# Patient Record
Sex: MALE | Race: WHITE | Employment: FULL TIME | URBAN - METROPOLITAN AREA
[De-identification: names, ages, dates, MRNs, and addresses within clinical notes are randomized per-mention and may not be internally consistent; named-entity substitution may affect disease eponyms.]

---

## 2017-03-09 PROBLEM — Z30.09 CONSULTATION FOR STERILIZATION: Status: ACTIVE | Noted: 2017-03-09

## 2017-07-15 ENCOUNTER — HOSPITAL ENCOUNTER (EMERGENCY)
Facility: HOSPITAL | Age: 47
Discharge: HOME | End: 2017-07-15
Attending: EMERGENCY MEDICINE
Payer: COMMERCIAL

## 2017-07-15 VITALS
BODY MASS INDEX: 26 KG/M2 | WEIGHT: 195 LBS | OXYGEN SATURATION: 95 % | RESPIRATION RATE: 16 BRPM | DIASTOLIC BLOOD PRESSURE: 71 MMHG | HEART RATE: 76 BPM | SYSTOLIC BLOOD PRESSURE: 126 MMHG | TEMPERATURE: 97 F

## 2017-07-15 DIAGNOSIS — T78.40XA ALLERGIC REACTION, INITIAL ENCOUNTER: Primary | ICD-10-CM

## 2017-07-15 PROCEDURE — 99285 EMERGENCY DEPT VISIT HI MDM: CPT

## 2017-07-15 PROCEDURE — 94664 DEMO&/EVAL PT USE INHALER: CPT

## 2017-07-15 PROCEDURE — 94640 AIRWAY INHALATION TREATMENT: CPT

## 2017-07-15 PROCEDURE — 96375 TX/PRO/DX INJ NEW DRUG ADDON: CPT

## 2017-07-15 PROCEDURE — 96372 THER/PROPH/DIAG INJ SC/IM: CPT

## 2017-07-15 PROCEDURE — S0028 INJECTION, FAMOTIDINE, 20 MG: HCPCS | Performed by: EMERGENCY MEDICINE

## 2017-07-15 PROCEDURE — 96374 THER/PROPH/DIAG INJ IV PUSH: CPT

## 2017-07-15 RX ORDER — EPINEPHRINE 0.3 MG/.3ML
0.3 INJECTION SUBCUTANEOUS
Qty: 1 EACH | Refills: 0 | Status: SHIPPED | OUTPATIENT
Start: 2017-07-15 | End: 2017-08-14

## 2017-07-15 RX ORDER — METHYLPREDNISOLONE 4 MG/1
TABLET ORAL
Qty: 1 PACKAGE | Refills: 0 | Status: SHIPPED | OUTPATIENT
Start: 2017-07-15 | End: 2017-07-20

## 2017-07-15 RX ORDER — DIPHENHYDRAMINE HYDROCHLORIDE 50 MG/ML
50 INJECTION INTRAMUSCULAR; INTRAVENOUS ONCE
Status: COMPLETED | OUTPATIENT
Start: 2017-07-15 | End: 2017-07-15

## 2017-07-15 RX ORDER — HYDROXYZINE HYDROCHLORIDE 25 MG/1
TABLET, FILM COATED ORAL
Qty: 20 TABLET | Refills: 0 | Status: SHIPPED | OUTPATIENT
Start: 2017-07-15 | End: 2017-08-14

## 2017-07-15 RX ORDER — ALBUTEROL SULFATE 2.5 MG/3ML
5 SOLUTION RESPIRATORY (INHALATION) ONCE
Status: COMPLETED | OUTPATIENT
Start: 2017-07-15 | End: 2017-07-15

## 2017-07-15 RX ORDER — FAMOTIDINE 10 MG/ML
20 INJECTION, SOLUTION INTRAVENOUS ONCE
Status: COMPLETED | OUTPATIENT
Start: 2017-07-15 | End: 2017-07-15

## 2017-07-15 RX ORDER — ALBUTEROL SULFATE 90 UG/1
2 AEROSOL, METERED RESPIRATORY (INHALATION) EVERY 4 HOURS PRN
Qty: 1 INHALER | Refills: 0 | Status: SHIPPED | OUTPATIENT
Start: 2017-07-15 | End: 2017-08-14

## 2017-07-15 RX ORDER — METHYLPREDNISOLONE SODIUM SUCCINATE 125 MG/2ML
125 INJECTION, POWDER, LYOPHILIZED, FOR SOLUTION INTRAMUSCULAR; INTRAVENOUS ONCE
Status: COMPLETED | OUTPATIENT
Start: 2017-07-15 | End: 2017-07-15

## 2017-07-15 NOTE — ED PROVIDER NOTES
Patient Seen in: [facility] Emergency Department    History   Patient presents with: Allergic Rxn Allergies (immune)    Stated Complaint: all over body rash, thinks is reaction to amoxicillin rx. denies KATI. HPI    Patient complains of rash.   Pa for Itching. finasteride (PROPECIA) 1 MG Oral Tab,  Take 1 mg by mouth daily.        Family History   Problem Relation Age of Onset   • Stroke Father    • Other[other] [OTHER] Mother      alzheimer's       Smoking status: Never Smoker nontender  Extremities: Unremarkable. Calves nonswollen, symmetric, nontender. No joint erythema or swelling  Skin:  diffuse maculopapular rash noted over the chest, back, and extremities.   It seems most prominent over the lower extremities especially pr at home.   I recommend rest,  combination antihistamines, Medrol Dosepak, albuterol inhaler, and certainly to have a low threshold to return if his symptoms were to worsen or change in anyway    Disposition and Plan     Clinical Impression:  Allergic reacti

## 2017-07-15 NOTE — ED INITIAL ASSESSMENT (HPI)
Red raised rash all over body. Rash started Tuesday after taking Amoxicillin for a dental infection. Pt changed to Clindamyacin, solumedrol IM at PMD yesterday and Allegra and Zantac without relief.  Denies KATI

## 2017-09-11 ENCOUNTER — HOSPITAL ENCOUNTER (OUTPATIENT)
Dept: GENERAL RADIOLOGY | Facility: HOSPITAL | Age: 47
Discharge: HOME | End: 2017-09-11
Attending: FAMILY MEDICINE
Payer: COMMERCIAL

## 2017-09-11 DIAGNOSIS — R06.2 WHEEZING: ICD-10-CM

## 2017-09-11 PROCEDURE — 71020 XR CHEST PA + LAT CHEST (CPT=71020): CPT | Performed by: FAMILY MEDICINE

## 2017-11-21 ENCOUNTER — RT VISIT (OUTPATIENT)
Dept: RESPIRATORY THERAPY | Facility: HOSPITAL | Age: 47
End: 2017-11-21
Attending: INTERNAL MEDICINE
Payer: COMMERCIAL

## 2017-11-21 DIAGNOSIS — R06.2 WHEEZING: ICD-10-CM

## 2017-11-21 PROCEDURE — 94070 EVALUATION OF WHEEZING: CPT

## 2017-11-21 NOTE — PROCEDURES
Spirometry and flow volume loop appear normal with no evidence of airway obstruction or restriction. Methacholine challenge testing was done. Spirometry at baseline shows no evidence of airway obstruction.     After exposure to methacholine  there was

## 2018-06-04 ENCOUNTER — HOSPITAL ENCOUNTER (OUTPATIENT)
Dept: PHYSICAL THERAPY | Facility: HOSPITAL | Age: 48
Setting detail: THERAPIES SERIES
Discharge: HOME | End: 2018-06-04
Attending: FAMILY MEDICINE
Payer: COMMERCIAL

## 2018-06-04 DIAGNOSIS — M54.16 LEFT LUMBAR RADICULOPATHY: ICD-10-CM

## 2018-06-04 DIAGNOSIS — M54.50 ACUTE EXACERBATION OF CHRONIC LOW BACK PAIN: ICD-10-CM

## 2018-06-04 DIAGNOSIS — G89.29 ACUTE EXACERBATION OF CHRONIC LOW BACK PAIN: ICD-10-CM

## 2018-06-04 PROCEDURE — 97162 PT EVAL MOD COMPLEX 30 MIN: CPT

## 2018-06-04 PROCEDURE — 97110 THERAPEUTIC EXERCISES: CPT

## 2018-06-04 NOTE — PROGRESS NOTES
SPINE EVALUATION:   Referring Physician: Dr. Arlin Du  Diagnosis: Acute exacerbation of chronic low back pain; Left lumbar radiculopathy.      Date of Service: 6/4/2018     PATIENT SUMMARY   Eliazar Syed is a 53year old y/o male who pres functional deficits. Precautions:  None  OBJECTIVE:   Observation/Posture: increased thoracic kyphosis, decreased lumbar lordosis. Sit to stand:  Without UEs is painful.    Gait: decent pace, hips shifted to the left, toes out B, decreased stride lengt ascend/descend a flight of stairs reciprocally without pain or difficulty. Pt will be independent with an upgraded HEP. Frequency / Duration: Patient will be seen for 2 x/week or a total of 8 visits over a 90 day period.    Treatment will include: [facility]

## 2018-06-11 ENCOUNTER — HOSPITAL ENCOUNTER (OUTPATIENT)
Dept: PHYSICAL THERAPY | Facility: HOSPITAL | Age: 48
Setting detail: THERAPIES SERIES
Discharge: HOME | End: 2018-06-11
Attending: FAMILY MEDICINE
Payer: COMMERCIAL

## 2018-06-11 PROCEDURE — 97140 MANUAL THERAPY 1/> REGIONS: CPT

## 2018-06-11 PROCEDURE — 97110 THERAPEUTIC EXERCISES: CPT

## 2018-06-11 NOTE — PROGRESS NOTES
Dx: Acute exacerbation of chronic low back pain; left lumbar radiculopathy.          Authorized # of Visits:  8         Next MD visit: none scheduled  Fall Risk: standard         Precautions: n/a             Subjective: Left low back/buttock and left LE lennox mobs... Side lying thoracic rotation \"open book\" x 10 each direction. stm on wall with ball left l-s area demonstrated for HEP. Grade III left UPAs and  L1-L5.           stm l-s area while prone for 3 minutes.          LTR w

## 2018-06-13 ENCOUNTER — HOSPITAL ENCOUNTER (OUTPATIENT)
Dept: PHYSICAL THERAPY | Facility: HOSPITAL | Age: 48
Setting detail: THERAPIES SERIES
Discharge: HOME | End: 2018-06-13
Attending: FAMILY MEDICINE
Payer: COMMERCIAL

## 2018-06-13 PROCEDURE — 97140 MANUAL THERAPY 1/> REGIONS: CPT

## 2018-06-13 PROCEDURE — 97110 THERAPEUTIC EXERCISES: CPT

## 2018-06-14 NOTE — PROGRESS NOTES
Dx: Acute exacerbation of chronic low back pain; left lumbar radiculopathy.          Authorized # of Visits:  8         Next MD visit: none scheduled  Fall Risk: standard         Precautions: n/a             Subjective: Left low back/buttock and left LE lennox therapy, education, LE/core strengthening, neural dynamics.     Date: 6/11/2018 TX#: 2/8 Date:       6/13/18        TX#: 3/8   Date:               TX#: 4/ Date:               TX#: 5/ Date:               TX#: 6/ Date:               TX#: 7/ Date:

## 2018-06-15 ENCOUNTER — HOSPITAL ENCOUNTER (OUTPATIENT)
Dept: PHYSICAL THERAPY | Facility: HOSPITAL | Age: 48
Setting detail: THERAPIES SERIES
Discharge: HOME | End: 2018-06-15
Attending: FAMILY MEDICINE
Payer: COMMERCIAL

## 2018-06-15 PROCEDURE — 97140 MANUAL THERAPY 1/> REGIONS: CPT

## 2018-06-15 PROCEDURE — 97110 THERAPEUTIC EXERCISES: CPT

## 2018-06-15 NOTE — PROGRESS NOTES
Dx: Acute exacerbation of chronic low back pain; left lumbar radiculopathy.          Authorized # of Visits:  8         Next MD visit: none scheduled  Fall Risk: standard         Precautions: n/a             Subjective: Left low back/buttock and left LE lennox rotation mobs Side lying grade III lumbar rotation mobs. .. Prone grade III hip PA mobs. .. Side lying lumbar paraspinal STM Prone lumbar paraspinal stm. .       Side lying thoracic rotation \"open book\" x 10 each direction.   Side lying thoracic rotati

## 2018-06-18 ENCOUNTER — HOSPITAL ENCOUNTER (OUTPATIENT)
Dept: PHYSICAL THERAPY | Facility: HOSPITAL | Age: 48
Setting detail: THERAPIES SERIES
Discharge: HOME | End: 2018-06-18
Attending: FAMILY MEDICINE
Payer: COMMERCIAL

## 2018-06-18 PROCEDURE — 97140 MANUAL THERAPY 1/> REGIONS: CPT

## 2018-06-18 PROCEDURE — 97110 THERAPEUTIC EXERCISES: CPT

## 2018-06-18 NOTE — PROGRESS NOTES
Dx: Acute exacerbation of chronic low back pain; left lumbar radiculopathy.          Authorized # of Visits:  8         Next MD visit: none scheduled  Fall Risk: standard         Precautions: n/a             Subjective: Left low back/buttock and left LE lennox mobs... Side lying grade III lumbar rotation mobs Side lying grade III lumbar rotation mobs. .. Prone T12-L1 CPA, 10 min      Prone grade III hip PA mobs. .. Side lying lumbar paraspinal STM Prone lumbar paraspinal stm. . Prone lumbar paraspinal stm. .      Caesar Bream Time: 43 min

## 2018-06-20 ENCOUNTER — HOSPITAL ENCOUNTER (OUTPATIENT)
Dept: PHYSICAL THERAPY | Facility: HOSPITAL | Age: 48
Setting detail: THERAPIES SERIES
Discharge: HOME | End: 2018-06-20
Attending: FAMILY MEDICINE
Payer: COMMERCIAL

## 2018-06-20 PROCEDURE — 97110 THERAPEUTIC EXERCISES: CPT

## 2018-06-20 PROCEDURE — 97140 MANUAL THERAPY 1/> REGIONS: CPT

## 2018-06-20 NOTE — PROGRESS NOTES
Dx: Acute exacerbation of chronic low back pain; left lumbar radiculopathy.          Authorized # of Visits:  8         Next MD visit: none scheduled  Fall Risk: standard         Precautions: n/a             Subjective:  Patient denies any \"real change\" o TX#: 7/ Date:               TX#: 8/   Side lying grade III lumbar rotation mobs. .. Side lying grade III lumbar rotation mobs Side lying grade III lumbar rotation mobs. .. Prone T12-L1 CPA, 10 min Prone T10-L2 grade 3 CPA for 5-6 minutes.      Prone grad left.   Sit<>stand w/ cuing to maintain neutral spine, 3x10 HEP. 1/2 kneeling psoas stretch 45 seconds each leg with cueing and as HEP. Standing gastroc stretch on left x 30 seconds. 1/2 kneeling trunk rotation Precor 10# x 10 to left and to right.

## 2018-06-22 ENCOUNTER — HOSPITAL ENCOUNTER (OUTPATIENT)
Dept: PHYSICAL THERAPY | Facility: HOSPITAL | Age: 48
Setting detail: THERAPIES SERIES
Discharge: HOME | End: 2018-06-22
Attending: FAMILY MEDICINE
Payer: COMMERCIAL

## 2018-06-22 PROCEDURE — 97110 THERAPEUTIC EXERCISES: CPT

## 2018-06-22 PROCEDURE — 97140 MANUAL THERAPY 1/> REGIONS: CPT

## 2018-06-22 NOTE — PROGRESS NOTES
Dx: Acute exacerbation of chronic low back pain; left lumbar radiculopathy.          Authorized # of Visits:  8         Next MD visit: none scheduled  Fall Risk: standard         Precautions: n/a            Progress Summary    Pt has attended 7 visits in  collapse but no pain. Palpation:  Tenderness left thoracolumbar paraspinal mm and left piriformis muscle. LE sensation to pin prick is intact bilaterally. SLS on the floor x 20 seconds each leg. Goals:    To be achieved over 8 PT sessions:   Pt w and  L1-L5. Piriformis stretching left 30 seconds x 2. 1/2 FR thoracic extension, 5 min For 5 minutes. -    stm l-s area while prone for 3 minutes. 6\" LSU x 15 each leg. Prone grade 3 left hip PA mobs by PT.  Prone grade III hip PA mobs left and r

## 2018-06-25 ENCOUNTER — HOSPITAL ENCOUNTER (OUTPATIENT)
Dept: PHYSICAL THERAPY | Facility: HOSPITAL | Age: 48
Setting detail: THERAPIES SERIES
Discharge: HOME | End: 2018-06-25
Attending: FAMILY MEDICINE
Payer: COMMERCIAL

## 2018-06-25 PROCEDURE — 97110 THERAPEUTIC EXERCISES: CPT

## 2018-06-25 PROCEDURE — 97140 MANUAL THERAPY 1/> REGIONS: CPT

## 2018-06-25 NOTE — PROGRESS NOTES
Dx: Acute exacerbation of chronic low back pain; left lumbar radiculopathy.          Authorized # of Visits:  8         Next MD visit: none scheduled  Fall Risk: standard         Precautions: n/a            Progress Summary    Pt has attended 8 visits in  from PT             Date: 6/11/2018 TX#: 2/8 Date:       6/13/18        TX#: 3/8   Date: 6/15/18               TX#: 4/8 Date:   6/18/18            TX#: 5/8 Date: 6/20/18              TX#: 6/8 Date: 6/22/18              TX#: 7/8 Date: 6/25/18              T sliders, 5 min  Passively and actively with belt X.    HEP was reviewed and practiced. X.    HEP was reviewed and practiced. X 5 minutes and seated active sliders x 10 l/r. . Supine sciatic nerve sliders by PT for 2-3 minutes; and as HEP with a belt.   Seate

## 2018-07-27 ENCOUNTER — HOSPITAL ENCOUNTER (OUTPATIENT)
Dept: MRI IMAGING | Age: 48
Discharge: HOME | End: 2018-07-27
Attending: FAMILY MEDICINE
Payer: COMMERCIAL

## 2018-07-27 DIAGNOSIS — M54.50 LOW BACK PAIN: ICD-10-CM

## 2018-07-27 DIAGNOSIS — M54.16 LUMBAR RADICULOPATHY: ICD-10-CM

## 2018-07-27 PROCEDURE — 72148 MRI LUMBAR SPINE W/O DYE: CPT | Performed by: FAMILY MEDICINE

## 2018-10-31 ENCOUNTER — HOSPITAL ENCOUNTER (OUTPATIENT)
Age: 48
Discharge: HOME | End: 2018-10-31
Attending: FAMILY MEDICINE
Payer: COMMERCIAL

## 2018-10-31 ENCOUNTER — APPOINTMENT (OUTPATIENT)
Dept: GENERAL RADIOLOGY | Age: 48
End: 2018-10-31
Attending: FAMILY MEDICINE
Payer: COMMERCIAL

## 2018-10-31 VITALS
HEIGHT: 73 IN | HEART RATE: 84 BPM | SYSTOLIC BLOOD PRESSURE: 145 MMHG | TEMPERATURE: 99 F | RESPIRATION RATE: 18 BRPM | BODY MASS INDEX: 25.84 KG/M2 | WEIGHT: 195 LBS | DIASTOLIC BLOOD PRESSURE: 90 MMHG | OXYGEN SATURATION: 96 %

## 2018-10-31 DIAGNOSIS — R06.2 WHEEZING: ICD-10-CM

## 2018-10-31 DIAGNOSIS — J20.9 ACUTE BRONCHITIS, UNSPECIFIED ORGANISM: Primary | ICD-10-CM

## 2018-10-31 PROCEDURE — 94640 AIRWAY INHALATION TREATMENT: CPT

## 2018-10-31 PROCEDURE — 71046 X-RAY EXAM CHEST 2 VIEWS: CPT | Performed by: FAMILY MEDICINE

## 2018-10-31 PROCEDURE — 99214 OFFICE O/P EST MOD 30 MIN: CPT

## 2018-10-31 PROCEDURE — 99204 OFFICE O/P NEW MOD 45 MIN: CPT

## 2018-10-31 RX ORDER — PREDNISONE 20 MG/1
40 TABLET ORAL ONCE
Status: COMPLETED | OUTPATIENT
Start: 2018-10-31 | End: 2018-10-31

## 2018-10-31 RX ORDER — ALBUTEROL SULFATE 90 UG/1
2 AEROSOL, METERED RESPIRATORY (INHALATION) EVERY 4 HOURS PRN
Qty: 1 INHALER | Refills: 0 | Status: SHIPPED | OUTPATIENT
Start: 2018-10-31 | End: 2018-11-30

## 2018-10-31 RX ORDER — AZITHROMYCIN 250 MG/1
TABLET, FILM COATED ORAL
Qty: 1 PACKAGE | Refills: 0 | Status: SHIPPED | OUTPATIENT
Start: 2018-10-31 | End: 2018-11-05

## 2018-10-31 RX ORDER — IPRATROPIUM BROMIDE AND ALBUTEROL SULFATE 2.5; .5 MG/3ML; MG/3ML
3 SOLUTION RESPIRATORY (INHALATION) ONCE
Status: COMPLETED | OUTPATIENT
Start: 2018-10-31 | End: 2018-10-31

## 2018-10-31 RX ORDER — PREDNISONE 20 MG/1
40 TABLET ORAL DAILY
Qty: 8 TABLET | Refills: 0 | Status: SHIPPED | OUTPATIENT
Start: 2018-10-31 | End: 2018-11-04

## 2018-10-31 NOTE — ED PROVIDER NOTES
Patient Seen in: [facility]    History   Patient presents with:  Cough/URI    Stated Complaint: cough x7 days    HPI    81-year-old male presently chief complaint of cough for the past 1 week.   Symptoms started with URI sym SpO2 96%   BMI 25.73 kg/m²         Physical Exam    Patient is alert oriented ×3 in no acute distress   conjunctiva clear no icterus  Bilateral tympanic membrane is clear without any redness  Oropharynx : mild posterior pharyngeal wall erythema with 2+ t puffs into the lungs every 4 (four) hours as needed for Wheezing. Qty: 1 Inhaler Refills: 0    predniSONE 20 MG Oral Tab  Take 2 tablets (40 mg total) by mouth daily for 4 days.   Qty: 8 tablet Refills: 0    azithromycin (ZITHROMAX Z-KELLEN) 250 MG Oral Tab

## 2019-02-06 PROCEDURE — 88305 TISSUE EXAM BY PATHOLOGIST: CPT | Performed by: INTERNAL MEDICINE

## (undated) NOTE — ED AVS SNAPSHOT
[facility] Emergency Department  [place]  [address]  [address] [place] [address]  Phone:  [phone]  Fax:  Teo   MRN: [id number]    Department:  [facility] Emergency Department   Date of Visit:  7/15/2017 IF THERE IS ANY CHANGE OR WORSENING OF YOUR CONDITION, CALL YOUR PRIMARY CARE PHYSICIAN AT ONCE OR RETURN IMMEDIATELY TO THE EMERGENCY DEPARTMENT.     If you have been prescribed any medication(s), please fill your prescription right away and begin taking t

## (undated) NOTE — LETTER
Patient Name: Julissa Alberto  YOB: 1970          MRN number:  [id number]  Date:  6/4/2018  Referring Physician:  Tito Helms*          SPINE EVALUATION:    Referring Physician: Dr. Stoner Shoulder  Diagnosis: Sabrina Guthrie lifting, and LE dressing at times. Roevrto Clarity would benefit from skilled Physical Therapy to address the above impairments to decrease pain and improve above noted functional deficits.      Precautions:  None  OBJECTIVE:   Observation/Posture: increased thora Pt will demonstrate neutral spine posture with bending and when lifting this off the floor. .  Pt will report being pain-free when standing for at least 30 minutes.   Pt will be dot to ascend/descend a flight of stairs reciprocally without pain or difficulty

## (undated) NOTE — LETTER
Patient Name: Rolly Cloud  YOB: 1970          MRN number:  [id number]  Date:  6/22/2018  Referring Physician:  Dimple Martinez*     Progress Summary    Pt has attended 7 visits in Physical Therapy to date.      Subjective: Phani Palpation:  Tenderness left thoracolumbar paraspinal mm and left piriformis muscle. LE sensation to pin prick is intact bilaterally. SLS on the floor x 20 seconds each leg. Goals:    To be achieved over 8 PT sessions:   Pt will demonstrate neutral